# Patient Record
Sex: FEMALE | Race: WHITE | NOT HISPANIC OR LATINO | Employment: FULL TIME | URBAN - NONMETROPOLITAN AREA
[De-identification: names, ages, dates, MRNs, and addresses within clinical notes are randomized per-mention and may not be internally consistent; named-entity substitution may affect disease eponyms.]

---

## 2023-01-17 ENCOUNTER — TELEPHONE (OUTPATIENT)
Dept: CARDIOLOGY | Facility: CLINIC | Age: 33
End: 2023-01-17
Payer: MEDICAID

## 2023-01-17 ENCOUNTER — OFFICE VISIT (OUTPATIENT)
Dept: CARDIOLOGY | Facility: CLINIC | Age: 33
End: 2023-01-17
Payer: MEDICAID

## 2023-01-17 VITALS
DIASTOLIC BLOOD PRESSURE: 88 MMHG | WEIGHT: 247 LBS | OXYGEN SATURATION: 96 % | BODY MASS INDEX: 39.7 KG/M2 | HEART RATE: 96 BPM | SYSTOLIC BLOOD PRESSURE: 121 MMHG | HEIGHT: 66 IN

## 2023-01-17 DIAGNOSIS — I10 PRIMARY HYPERTENSION: ICD-10-CM

## 2023-01-17 DIAGNOSIS — R07.2 PRECORDIAL PAIN: Primary | ICD-10-CM

## 2023-01-17 DIAGNOSIS — R06.09 DYSPNEA ON EXERTION: ICD-10-CM

## 2023-01-17 PROCEDURE — 93000 ELECTROCARDIOGRAM COMPLETE: CPT | Performed by: PHYSICIAN ASSISTANT

## 2023-01-17 PROCEDURE — 99204 OFFICE O/P NEW MOD 45 MIN: CPT | Performed by: PHYSICIAN ASSISTANT

## 2023-01-17 RX ORDER — MIRTAZAPINE 30 MG/1
30 TABLET, FILM COATED ORAL
COMMUNITY
Start: 2022-12-19

## 2023-01-17 RX ORDER — CHOLECALCIFEROL (VITAMIN D3) 125 MCG
CAPSULE ORAL
COMMUNITY
Start: 2023-01-04

## 2023-01-17 RX ORDER — MELOXICAM 15 MG/1
15 TABLET ORAL DAILY
COMMUNITY
Start: 2022-11-29 | End: 2023-01-18

## 2023-01-17 RX ORDER — VENLAFAXINE 100 MG/1
100 TABLET ORAL 2 TIMES DAILY
COMMUNITY
Start: 2022-12-07

## 2023-01-17 RX ORDER — CHLORPROMAZINE HYDROCHLORIDE 25 MG/1
TABLET, FILM COATED ORAL
COMMUNITY
Start: 2022-12-19

## 2023-01-17 RX ORDER — SUMATRIPTAN 100 MG/1
TABLET, FILM COATED ORAL
COMMUNITY
Start: 2022-12-19

## 2023-01-17 RX ORDER — AMOXICILLIN 500 MG/1
CAPSULE ORAL
COMMUNITY
Start: 2023-01-13

## 2023-01-17 RX ORDER — OMEPRAZOLE 40 MG/1
CAPSULE, DELAYED RELEASE ORAL
COMMUNITY
Start: 2022-10-17

## 2023-01-17 RX ORDER — LORATADINE 10 MG/1
10 TABLET ORAL DAILY
COMMUNITY
Start: 2023-01-04

## 2023-01-17 RX ORDER — IBUPROFEN 600 MG/1
TABLET ORAL
COMMUNITY
Start: 2022-12-19 | End: 2023-01-18

## 2023-01-17 RX ORDER — FLUTICASONE PROPIONATE 50 MCG
2 SPRAY, SUSPENSION (ML) NASAL DAILY
COMMUNITY
Start: 2023-01-04

## 2023-01-17 RX ORDER — SUCRALFATE 1 G/1
TABLET ORAL
COMMUNITY
Start: 2023-01-04

## 2023-01-17 RX ORDER — MECLIZINE HYDROCHLORIDE 25 MG/1
25 TABLET ORAL 3 TIMES DAILY PRN
COMMUNITY

## 2023-01-17 RX ORDER — PRAZOSIN HYDROCHLORIDE 2 MG/1
4 CAPSULE ORAL
COMMUNITY
Start: 2022-12-19

## 2023-01-17 RX ORDER — DIPHENHYDRAMINE HYDROCHLORIDE 25 MG/1
CAPSULE ORAL
COMMUNITY
Start: 2022-12-19

## 2023-01-17 NOTE — TELEPHONE ENCOUNTER
PER JUAN SORIA,PAC VERBAL  PLEASE GET ALL RECORDS, LABS/TESTING ETC FROM [facility] AND BRING TO HIM TO REVIEW.

## 2023-01-17 NOTE — PROGRESS NOTES
Subjective   Mary Quevedo is a 32 y.o. female     Chief Complaint   Patient presents with   • Establish Care     Chest pain       HPI  The patient presents in the clinic today for evaluation given chest discomfort.  She denies established history of cardiovascular complications.  She reports that her chest pain has been present basically for 1 to 2 weeks.  She had a severe episode of chest discomfort recently which prompted evaluation through the emergency room.  I do not have any of those records but by report, cardiac enzymes, EKG, and work-up otherwise was benign.  We have requested those records but have not received them at time of evaluation.  She reports that her chest pain is constant.  This is precordial.  At this is intensified with deep palpation.  She reports no pleuritic component at this time.  She has had no wheezing, hemoptysis, or cough associated with that.  She reports no fever nor chills.  She does have associated intensification of dyspnea at times.  She has found nothing otherwise which aggravates or alleviates her discomfort.  She initially tried to alternate ibuprofen and Tylenol for pain relief.  This yielded no improvement in her symptoms.  After evaluation through the emergency room, she was discharged and advised to follow-up with cardiology immediately.  She presents today in the setting.  Otherwise, the patient has no failure symptoms.  She denies palpitations, dizziness, or syncope.  She has no further complaints at this time.      Current Outpatient Medications   Medication Sig Dispense Refill   • amoxicillin (AMOXIL) 500 MG capsule TAKE 1 Capsule EVERY 8 HOURS FOR 10 DAYS     • Banophen 25 MG capsule Take 1 Capsule 3 times per day     • chlorproMAZINE (THORAZINE) 25 MG tablet Take 1 Tablet 3 times per day     • fluticasone (FLONASE) 50 MCG/ACT nasal spray 2 sprays by Each Nare route Daily.     • loratadine (CLARITIN) 10 MG tablet Take 10 mg by mouth Daily.     • meclizine  (ANTIVERT) 25 MG tablet Take 25 mg by mouth 3 (Three) Times a Day As Needed for Dizziness.     • melatonin 5 MG tablet tablet TAKE ONE TABLET ever evening     • mirtazapine (REMERON) 30 MG tablet Take 30 mg by mouth every night at bedtime.     • omeprazole (priLOSEC) 40 MG capsule TAKE ONE CAPSULE BY MOUTH 30 minutes BEFORE morning meal EVERY MORNING ONCE daily     • prazosin (MINIPRESS) 2 MG capsule Take 4 mg by mouth every night at bedtime.     • sucralfate (CARAFATE) 1 g tablet TAKE 1 Tablet TWICE A DAY on an empty stomach     • SUMAtriptan (IMITREX) 100 MG tablet TAKE 1 TABLET BY MOUTH AT ONSET OF MIGRAINE. MAY REPEAT ONCE AFTER 2 HOURS IF NEEDED     • venlafaxine (EFFEXOR) 100 MG tablet Take 100 mg by mouth 2 (Two) Times a Day.     • vitamin D3 125 MCG (5000 UT) capsule capsule Take 1 capsule by mouth Daily.     • indomethacin (INDOCIN) 25 MG capsule Take 1 capsule by mouth 3 (Three) Times a Day. 21 capsule 0     No current facility-administered medications for this visit.       Patient has no known allergies.    Past Medical History:   Diagnosis Date   • Hypertension        Social History     Socioeconomic History   • Marital status: Single   Tobacco Use   • Smoking status: Former     Years: 17.00     Types: Cigarettes   • Smokeless tobacco: Never   Substance and Sexual Activity   • Alcohol use: Never   • Drug use: Never   • Sexual activity: Defer       Family History   Problem Relation Age of Onset   • Heart attack Mother    • Stroke Mother    • Heart failure Father        Review of Systems   Constitutional: Negative.  Negative for activity change, appetite change, chills, fatigue and fever.   HENT: Negative.  Negative for congestion.    Eyes: Negative.  Negative for visual disturbance.   Respiratory: Positive for shortness of breath and wheezing. Negative for apnea, cough and chest tightness.    Cardiovascular: Positive for chest pain. Negative for palpitations and leg swelling.   Gastrointestinal: Negative  "for blood in stool.   Endocrine: Negative.  Negative for cold intolerance and heat intolerance.   Genitourinary: Negative.  Negative for hematuria.   Musculoskeletal: Positive for back pain and neck pain. Negative for arthralgias, gait problem, joint swelling and neck stiffness.   Skin: Negative for color change, rash and wound.   Allergic/Immunologic: Negative.  Negative for environmental allergies and food allergies.   Neurological: Negative.  Negative for dizziness, syncope, weakness, light-headedness, numbness and headaches.   Hematological: Negative.  Does not bruise/bleed easily.   Psychiatric/Behavioral: Positive for sleep disturbance.       Objective     Vitals:    01/17/23 1123   BP: 121/88   BP Location: Left arm   Patient Position: Sitting   Cuff Size: Adult   Pulse: 96   SpO2: 96%   Weight: 112 kg (247 lb)   Height: 167.6 cm (66\")        /88 (BP Location: Left arm, Patient Position: Sitting, Cuff Size: Adult)   Pulse 96   Ht 167.6 cm (66\")   Wt 112 kg (247 lb)   SpO2 96%   BMI 39.87 kg/m²      Lab Results (most recent)     None          Physical Exam  Vitals and nursing note reviewed.   Constitutional:       General: She is not in acute distress.     Appearance: She is well-developed.   HENT:      Head: Normocephalic and atraumatic.   Eyes:      Conjunctiva/sclera: Conjunctivae normal.      Pupils: Pupils are equal, round, and reactive to light.   Neck:      Vascular: No JVD.      Trachea: No tracheal deviation.   Cardiovascular:      Rate and Rhythm: Normal rate and regular rhythm.      Heart sounds: Normal heart sounds.   Pulmonary:      Effort: Pulmonary effort is normal.      Breath sounds: Normal breath sounds.   Abdominal:      General: Bowel sounds are normal. There is no distension.      Palpations: Abdomen is soft. There is no mass.      Tenderness: There is no abdominal tenderness. There is no guarding or rebound.   Musculoskeletal:         General: No tenderness or deformity. Normal " range of motion.      Cervical back: Normal range of motion and neck supple.   Skin:     General: Skin is warm and dry.      Coloration: Skin is not pale.      Findings: No erythema or rash.   Neurological:      Mental Status: She is alert and oriented to person, place, and time.   Psychiatric:         Behavior: Behavior normal.         Thought Content: Thought content normal.         Judgment: Judgment normal.         Procedure     ECG 12 Lead    Date/Time: 1/17/2023 11:35 AM  Performed by: Amadeo Reynolds PA  Authorized by: Amadeo Reynolds PA   Comparison: not compared with previous ECG   Comments: Sinus rhythm, rate 94, normal axis, no acute changes noted.                 Assessment & Plan      Diagnosis Plan   1. Precordial pain  Treadmill Stress Test    Adult Transthoracic Echo Complete W/ Cont if Necessary Per Protocol      2. Dyspnea on exertion  Treadmill Stress Test    Adult Transthoracic Echo Complete W/ Cont if Necessary Per Protocol      3. Primary hypertension  Treadmill Stress Test    Adult Transthoracic Echo Complete W/ Cont if Necessary Per Protocol        1.  Overall, the patient has atypical chest pain.  By description, I would suspect chest wall pain.  Irregardless, cardiac evaluation has been requested.  We will schedule for an echo so that we may evaluate LV size and function, the pericardium, and cardiac structure otherwise.    2.  We will also schedule for regular treadmill stress test just for risk stratification.    3.  I have requested records from her recent ER evaluation.  I would evaluate specifically for cardiac enzymes, D-dimer, inflammatory markers, if performed.  If not, we will proceed accordingly.  We have also requested chest x-ray and her test results otherwise.    4.  The patient is mostly normotensive today.  She will monitor this and call for complications.    5.  The patient did not respond to ibuprofen nor medical regimen otherwise.  We have requested that she hold  anti-inflammatories currently administered.  We have given her moderate dosing Indocin to see if she responds to the same.  I would make no further adjustments in medications.    6.  We will see the patient back to review all the above and recommend further.  She will call for complications.      Patient did not bring med list or medicine bottles to appointment, med list has been reviewed and updated based on patient's knowledge of their meds.            Electronically signed by:

## 2023-01-18 ENCOUNTER — TELEPHONE (OUTPATIENT)
Dept: CARDIOLOGY | Facility: CLINIC | Age: 33
End: 2023-01-18
Payer: MEDICAID

## 2023-01-18 DIAGNOSIS — R06.09 DYSPNEA ON EXERTION: ICD-10-CM

## 2023-01-18 DIAGNOSIS — R07.2 PRECORDIAL PAIN: Primary | ICD-10-CM

## 2023-01-18 DIAGNOSIS — I10 PRIMARY HYPERTENSION: ICD-10-CM

## 2023-01-18 RX ORDER — INDOMETHACIN 25 MG/1
25 CAPSULE ORAL 3 TIMES DAILY
Qty: 21 CAPSULE | Refills: 0 | Status: SHIPPED | OUTPATIENT
Start: 2023-01-18

## 2023-01-18 NOTE — TELEPHONE ENCOUNTER
Patient has been contacted, and understands mindi recommendations below. She is aware of lab hours and will be get done in the am.

## 2023-01-18 NOTE — TELEPHONE ENCOUNTER
PER JUAN SORIA,PAC VERBAL PLEASE ORDER PATIENT D-DIMER/CRP/SEDRATE    MAKE PATIENT AWARE NEEDS TO HOLD MOBIC, WHILE ON INDOCIN.     TRIED TO CONTACT PATIENT NO ANSWER, LEFT VM TO RETURN CALL.    Isabel Hernadez MA

## 2023-01-19 ENCOUNTER — LAB (OUTPATIENT)
Dept: LAB | Facility: HOSPITAL | Age: 33
End: 2023-01-19
Payer: MEDICAID

## 2023-01-19 DIAGNOSIS — R06.09 DYSPNEA ON EXERTION: ICD-10-CM

## 2023-01-19 DIAGNOSIS — I10 PRIMARY HYPERTENSION: ICD-10-CM

## 2023-01-19 DIAGNOSIS — R07.2 PRECORDIAL PAIN: ICD-10-CM

## 2023-01-19 LAB
CRP SERPL-MCNC: 0.61 MG/DL (ref 0–0.5)
D DIMER PPP FEU-MCNC: 0.32 MCGFEU/ML (ref 0–0.5)
ERYTHROCYTE [SEDIMENTATION RATE] IN BLOOD: 10 MM/HR (ref 0–20)

## 2023-01-19 PROCEDURE — 86140 C-REACTIVE PROTEIN: CPT | Performed by: PHYSICIAN ASSISTANT

## 2023-01-19 PROCEDURE — 85379 FIBRIN DEGRADATION QUANT: CPT | Performed by: PHYSICIAN ASSISTANT

## 2023-01-19 PROCEDURE — 85652 RBC SED RATE AUTOMATED: CPT | Performed by: PHYSICIAN ASSISTANT

## 2023-01-23 ENCOUNTER — TELEPHONE (OUTPATIENT)
Dept: CARDIOLOGY | Facility: CLINIC | Age: 33
End: 2023-01-23
Payer: MEDICAID

## 2023-01-23 NOTE — TELEPHONE ENCOUNTER
----- Message from JEF Schultz sent at 1/20/2023  2:01 PM EST -----  D-dimer and sed rate benign.  CRP minimally elevated.  Nothing further and proceed with medications as I recommended.  ----- Message -----  From: Lab, Background User  Sent: 1/19/2023   1:50 PM EST  To: JEF Schultz

## 2023-01-23 NOTE — TELEPHONE ENCOUNTER
Hub to read.  Tried to contact patient, no answer. Left VM for patient to return call.    Patient has been notified, verbally understands. Sent labs to PCP.